# Patient Record
Sex: MALE | Race: WHITE | NOT HISPANIC OR LATINO | Employment: FULL TIME | URBAN - METROPOLITAN AREA
[De-identification: names, ages, dates, MRNs, and addresses within clinical notes are randomized per-mention and may not be internally consistent; named-entity substitution may affect disease eponyms.]

---

## 2017-05-25 ENCOUNTER — TELEPHONE (OUTPATIENT)
Dept: ORTHOPEDIC SURGERY | Facility: CLINIC | Age: 42
End: 2017-05-25

## 2017-05-25 ENCOUNTER — OFFICE VISIT (OUTPATIENT)
Dept: ORTHOPEDIC SURGERY | Facility: CLINIC | Age: 42
End: 2017-05-25

## 2017-05-25 VITALS — HEIGHT: 70 IN | BODY MASS INDEX: 26.92 KG/M2 | TEMPERATURE: 97.2 F | WEIGHT: 188 LBS

## 2017-05-25 DIAGNOSIS — S99.922A FOOT INJURY, LEFT, INITIAL ENCOUNTER: Primary | ICD-10-CM

## 2017-05-25 DIAGNOSIS — S92.355A CLOSED NONDISPLACED FRACTURE OF FIFTH METATARSAL BONE OF LEFT FOOT, INITIAL ENCOUNTER: ICD-10-CM

## 2017-05-25 PROCEDURE — 99214 OFFICE O/P EST MOD 30 MIN: CPT | Performed by: ORTHOPAEDIC SURGERY

## 2017-05-25 PROCEDURE — 73630 X-RAY EXAM OF FOOT: CPT | Performed by: ORTHOPAEDIC SURGERY

## 2017-05-25 RX ORDER — CEFAZOLIN SODIUM 2 G/100ML
2 INJECTION, SOLUTION INTRAVENOUS ONCE
Status: CANCELLED | OUTPATIENT
Start: 2017-05-25 | End: 2017-05-25

## 2017-05-25 RX ORDER — SODIUM CHLORIDE 0.9 % (FLUSH) 0.9 %
1-10 SYRINGE (ML) INJECTION AS NEEDED
Status: CANCELLED | OUTPATIENT
Start: 2017-05-25

## 2017-06-02 ENCOUNTER — APPOINTMENT (OUTPATIENT)
Dept: PREADMISSION TESTING | Facility: HOSPITAL | Age: 42
End: 2017-06-02

## 2017-06-02 VITALS
DIASTOLIC BLOOD PRESSURE: 90 MMHG | HEIGHT: 70 IN | SYSTOLIC BLOOD PRESSURE: 134 MMHG | TEMPERATURE: 97 F | BODY MASS INDEX: 27.2 KG/M2 | HEART RATE: 58 BPM | RESPIRATION RATE: 16 BRPM | WEIGHT: 190 LBS | OXYGEN SATURATION: 99 %

## 2017-06-02 LAB
DEPRECATED RDW RBC AUTO: 39.4 FL (ref 37–54)
ERYTHROCYTE [DISTWIDTH] IN BLOOD BY AUTOMATED COUNT: 13.7 % (ref 11.5–14.5)
HCT VFR BLD AUTO: 44.3 % (ref 40.4–52.2)
HGB BLD-MCNC: 14.9 G/DL (ref 13.7–17.6)
MCH RBC QN AUTO: 26.8 PG (ref 27–32.7)
MCHC RBC AUTO-ENTMCNC: 33.6 G/DL (ref 32.6–36.4)
MCV RBC AUTO: 79.7 FL (ref 79.8–96.2)
PLATELET # BLD AUTO: 292 10*3/MM3 (ref 140–500)
PMV BLD AUTO: 10.2 FL (ref 6–12)
RBC # BLD AUTO: 5.56 10*6/MM3 (ref 4.6–6)
WBC NRBC COR # BLD: 4.13 10*3/MM3 (ref 4.5–10.7)

## 2017-06-02 PROCEDURE — 85027 COMPLETE CBC AUTOMATED: CPT | Performed by: ORTHOPAEDIC SURGERY

## 2017-06-02 PROCEDURE — 36415 COLL VENOUS BLD VENIPUNCTURE: CPT

## 2017-06-02 RX ORDER — ACETAMINOPHEN 500 MG
500 TABLET ORAL EVERY 6 HOURS PRN
COMMUNITY
End: 2017-06-06 | Stop reason: HOSPADM

## 2017-06-02 NOTE — DISCHARGE INSTRUCTIONS
Take the following medications the morning of surgery with a small sip of water:    NONE    ARRIVE AT 5:45    General Instructions:  • Do not eat solid food after midnight the night before surgery.  • You may drink clear liquids day of surgery but must stop at least one hour before your hospital arrival time.  • It is beneficial for you to have a clear drink that contains carbohydrates the day of surgery.  We suggest a 20 ounce bottle of Gatorade or Powerade for non-diabetic patients or a 20 ounce bottle of G2 or Powerade Zero for diabetic patients. (Pediatric patients, are not advised to drink a 20 ounce carbohydrate drink)    Clear liquids are liquids you can see through.  Nothing red in color.     Plain water                               Sports drinks  Sodas                                   Gelatin (Jell-O)  Fruit juices without pulp such as white grape juice and apple juice  Popsicles that contain no fruit or yogurt  Tea or coffee (no cream or milk added)  Gatorade / Powerade  G2 / Powerade Zero    • Infants may have breast milk up to four hours before surgery.  • Infants drinking formula may drink formula up to six hours before surgery.   • Patients who avoid smoking, chewing tobacco and alcohol for 4 weeks prior to surgery have a reduced risk of post-operative complications.  Quit smoking as many days before surgery as you can.  • Do not smoke, use chewing tobacco or drink alcohol the day of surgery.   • If applicable bring your C-PAP/ BI-PAP machine.  • Bring any papers given to you in the doctor’s office.  • Wear clean comfortable clothes and socks.  • Do not wear contact lenses or make-up.  Bring a case for your glasses.   • Bring crutches or walker if applicable.  • Leave all other valuables and jewelry at home.  • The Pre-Admission Testing nurse will instruct you to bring medications if unable to obtain an accurate list in Pre-Admission Testing.        If you were given a blood bank ID arm band remember  to bring it with you the day of surgery.    Preventing a Surgical Site Infection:  • For 2 to 3 days before surgery, avoid shaving with a razor because the razor can irritate skin and make it easier to develop an infection.  • The night prior to surgery sleep in a clean bed with clean clothing.  Do not allow pets to sleep with you.  • Shower on the morning of surgery using a fresh bar of anti-bacterial soap (such as Dial) and clean washcloth.  Dry with a clean towel and dress in clean clothing.  • Ask your surgeon if you will be receiving antibiotics prior to surgery.  • Make sure you, your family, and all healthcare providers clean their hands with soap and water or an alcohol based hand  before caring for you or your wound.    Day of surgery:  Upon arrival, a Pre-op nurse and Anesthesiologist will review your health history, obtain vital signs, and answer questions you may have.  The only belongings needed at this time will be your home medications and if applicable your C-PAP/BI-PAP machine.  If you are staying overnight your family can leave the rest of your belongings in the car and bring them to your room later.  A Pre-op nurse will start an IV and you may receive medication in preparation for surgery, including something to help you relax.  Your family will be able to see you in the Pre-op area.  While you are in surgery your family should notify the waiting room  if they leave the waiting room area and provide a contact phone number.    Please be aware that surgery does come with discomfort.  We want to make every effort to control your discomfort so please discuss any uncontrolled symptoms with your nurse.   Your doctor will most likely have prescribed pain medications.      If you are going home after surgery you will receive individualized written care instructions before being discharged.  A responsible adult must drive you to and from the hospital on the day of your surgery and stay  with you for 24 hours.    If you are staying overnight following surgery, you will be transported to your hospital room following the recovery period.  [facility] has all private rooms.    If you have any questions please call Pre-Admission Testing at [phone].  Deductibles and co-payments are collected on the day of service. Please be prepared to pay the required co-pay, deductible or deposit on the day of service as defined by your plan.

## 2017-06-05 NOTE — H&P
Patient: Romaine Esquivel II  YOB: 1975 41 y.o. male  Medical Record Number: [id number]     Chief Complaints: I hurt my foot     History of Present Illness: Patient underwent extensive left lateral ankle reconstruction and calcaneal osteotomy about 4 years ago and has done very well with that. He's not had any feelings of instability to his foot or ankle or feelings that he is walking on the side of his foot. However 2 weeks ago after some increased activity he had mild intermittent achy pain in the lateral side of his foot and then several days ago stepped awkwardly on a hose twisting his foot with onset of severe constant stabbing pain with swelling in the lateral aspect of the foot worse with standing and walking and improved with ice and rest. Does not have any complaints of pain about the ankle.      HPI     Allergies:        Allergies   Allergen Reactions   • No Known Drug Allergy           Medications:        Current Outpatient Prescriptions on File Prior to Visit   Medication Sig   • glucosamine-chondroitin 500-400 MG capsule capsule Take by mouth 3 (three) times a day with meals.      No current facility-administered medications on file prior to visit.           Medical History    History reviewed. No pertinent past medical history.      Surgical History          Past Surgical History:   Procedure Laterality Date   • ANKLE SURGERY Left           Social History          Occupational History   • Not on file.             Social History Main Topics   • Smoking status: Never Smoker   • Smokeless tobacco: Never Used   • Alcohol use No   • Drug use: No   • Sexual activity: Defer         Comment:       Social History      Social History Narrative      Family History   Problem Relation Age of Onset   • Hypertension Other     • Diabetes Other           Review of Systems: 14 point review of systems performed, positive pertinent findings identified in HPI. All remaining systems negative  "     Review of Systems        Physical Exam:    Vitals        Vitals:     05/25/17 1602   Temp: 97.2 °F (36.2 °C)   TempSrc: Temporal Artery    Weight: 188 lb (85.3 kg)   Height: 70\" (177.8 cm)         Physical Exam   Constitutional: pleasant, well developed   Eyes: sclera non icteric  Hearing : adequate for exam  Cardiovascular: palpable pulses in foot, calf/ thigh NT without sign of DVT  Respiratoy: breathing unlabored   Neurological: grossly sensate to LT throughout LE  Psychiatric: oriented with normal mood and affect.   Lymphatic: No palpable popliteal lymphadenopathy  Skin: intact throughout leg/foot  Musculoskeletal:Examination of left foot shows well-healed incisions about the ankle. He had no pain over the fibula and had 5 out of 5 eversion strength with neutral alignment to the calcaneus. Was moderate discomfort palpation on the lateral aspect of the fifth metatarsal but no pain over the dorsum of the midfoot. He is able to flex and extend his toes well. Does still have a relatively high arch.  No callus along the lateral aspect of the foot  Physical Exam  Ortho Exam     Radiology: 3 views of left foot were ordered to evaluate pain reviewed and compared with x-rays taken previously in our office of his ankle. The calcaneal osteotomy appears to be well-healed there is no lucency around the screws. There does appear to be a fracture at the metaphyseal diaphyseal junction of the fifth metatarsal was felt a slight amount of callus around it but was without significant displacement.     Assessment/Plan: Left fifth metatarsal acute traumatic fracture with probable previous stress reaction for the last 2 weeks.     Status post left lateral ankle reconstruction without feelings of instability or walking on the side of his foot.     I discussed treatment options with the patient both operative and nonoperative with neutral decision made to proceed with operative treatment. I feel that intramedullary fixation and " bone grafting is can give us the best chance of healing as quickly as possible to allow him to return to more normal activities sooner rather than later. I do not feel at this time that we need to do any type of plantar fascial release or first metatarsal osteotomy and he is in agreement with that. He understands we could treat this nonoperatively but it may take 6-8 weeks or longer of nonweightbearing that still may not heal and may then necessitate surgical treatment. He would like to go and proceed with operative treatment in the form of intramedullary fixation and use of probable cadaver graft in the form of BMP. He voiced clear understanding of the operative procedure with associated risks benefits potential outcomes and complications which can include but are not limited to heart attack stroke death pneumonia infection bleeding damage to blood vessels and nerves or tendons blood clots pulmonary embolism persistent or worsening pain stiffness fracture around the screw or need for subsequent surgery to the foot as well as small risk of disease transmission from the bone graft. All of his questions answered to his full satisfaction. We'll schedule this as soon as possible hopefully next week. In the interim he will continue use of his boot and crutches doing touchdown weightbearing only. He will call if he has new questions prior to surgery

## 2017-06-06 ENCOUNTER — ANESTHESIA EVENT (OUTPATIENT)
Dept: PERIOP | Facility: HOSPITAL | Age: 42
End: 2017-06-06

## 2017-06-06 ENCOUNTER — APPOINTMENT (OUTPATIENT)
Dept: GENERAL RADIOLOGY | Facility: HOSPITAL | Age: 42
End: 2017-06-06

## 2017-06-06 ENCOUNTER — HOSPITAL ENCOUNTER (OUTPATIENT)
Facility: HOSPITAL | Age: 42
Setting detail: HOSPITAL OUTPATIENT SURGERY
Discharge: HOME | End: 2017-06-06
Attending: ORTHOPAEDIC SURGERY | Admitting: ORTHOPAEDIC SURGERY

## 2017-06-06 ENCOUNTER — ANESTHESIA (OUTPATIENT)
Dept: PERIOP | Facility: HOSPITAL | Age: 42
End: 2017-06-06

## 2017-06-06 VITALS
TEMPERATURE: 97.6 F | RESPIRATION RATE: 16 BRPM | SYSTOLIC BLOOD PRESSURE: 129 MMHG | WEIGHT: 193 LBS | BODY MASS INDEX: 27.69 KG/M2 | OXYGEN SATURATION: 97 % | DIASTOLIC BLOOD PRESSURE: 76 MMHG | HEART RATE: 56 BPM

## 2017-06-06 DIAGNOSIS — S92.355A CLOSED NONDISPLACED FRACTURE OF FIFTH METATARSAL BONE OF LEFT FOOT, INITIAL ENCOUNTER: ICD-10-CM

## 2017-06-06 DIAGNOSIS — S99.922A FOOT INJURY, LEFT, INITIAL ENCOUNTER: ICD-10-CM

## 2017-06-06 PROCEDURE — 73630 X-RAY EXAM OF FOOT: CPT

## 2017-06-06 PROCEDURE — C1713 ANCHOR/SCREW BN/BN,TIS/BN: HCPCS | Performed by: ORTHOPAEDIC SURGERY

## 2017-06-06 PROCEDURE — 28485 OPTX METATARSAL FX EACH: CPT | Performed by: ORTHOPAEDIC SURGERY

## 2017-06-06 PROCEDURE — 25010000002 ONDANSETRON PER 1 MG: Performed by: NURSE ANESTHETIST, CERTIFIED REGISTERED

## 2017-06-06 PROCEDURE — 25010000002 MIDAZOLAM PER 1 MG: Performed by: ANESTHESIOLOGY

## 2017-06-06 PROCEDURE — 25010000002 PROPOFOL 10 MG/ML EMULSION: Performed by: NURSE ANESTHETIST, CERTIFIED REGISTERED

## 2017-06-06 PROCEDURE — 25010000002 FENTANYL CITRATE (PF) 100 MCG/2ML SOLUTION: Performed by: ANESTHESIOLOGY

## 2017-06-06 PROCEDURE — 25010000002 DEXAMETHASONE PER 1 MG: Performed by: NURSE ANESTHETIST, CERTIFIED REGISTERED

## 2017-06-06 PROCEDURE — 25010000002 DEXAMETHASONE PER 1 MG: Performed by: ANESTHESIOLOGY

## 2017-06-06 PROCEDURE — 25010000003 CEFAZOLIN IN DEXTROSE 2-4 GM/100ML-% SOLUTION: Performed by: ORTHOPAEDIC SURGERY

## 2017-06-06 PROCEDURE — 25010000002 ROPIVACAINE PER 1 MG: Performed by: ANESTHESIOLOGY

## 2017-06-06 PROCEDURE — 76000 FLUOROSCOPY <1 HR PHYS/QHP: CPT

## 2017-06-06 PROCEDURE — 25010000002 FENTANYL CITRATE (PF) 100 MCG/2ML SOLUTION: Performed by: NURSE ANESTHETIST, CERTIFIED REGISTERED

## 2017-06-06 DEVICE — IMPLANTABLE DEVICE: Type: IMPLANTABLE DEVICE | Site: FOOT | Status: FUNCTIONAL

## 2017-06-06 RX ORDER — ONDANSETRON 2 MG/ML
INJECTION INTRAMUSCULAR; INTRAVENOUS AS NEEDED
Status: DISCONTINUED | OUTPATIENT
Start: 2017-06-06 | End: 2017-06-06 | Stop reason: SURG

## 2017-06-06 RX ORDER — MIDAZOLAM HYDROCHLORIDE 1 MG/ML
2 INJECTION INTRAMUSCULAR; INTRAVENOUS
Status: DISCONTINUED | OUTPATIENT
Start: 2017-06-06 | End: 2017-06-06 | Stop reason: HOSPADM

## 2017-06-06 RX ORDER — ACETAMINOPHEN 650 MG
TABLET, EXTENDED RELEASE ORAL AS NEEDED
Status: DISCONTINUED | OUTPATIENT
Start: 2017-06-06 | End: 2017-06-06 | Stop reason: HOSPADM

## 2017-06-06 RX ORDER — DEXAMETHASONE SODIUM PHOSPHATE 10 MG/ML
INJECTION INTRAMUSCULAR; INTRAVENOUS AS NEEDED
Status: DISCONTINUED | OUTPATIENT
Start: 2017-06-06 | End: 2017-06-06 | Stop reason: SURG

## 2017-06-06 RX ORDER — FENTANYL CITRATE 50 UG/ML
50 INJECTION, SOLUTION INTRAMUSCULAR; INTRAVENOUS
Status: DISCONTINUED | OUTPATIENT
Start: 2017-06-06 | End: 2017-06-06 | Stop reason: HOSPADM

## 2017-06-06 RX ORDER — FAMOTIDINE 10 MG/ML
20 INJECTION, SOLUTION INTRAVENOUS ONCE
Status: COMPLETED | OUTPATIENT
Start: 2017-06-06 | End: 2017-06-06

## 2017-06-06 RX ORDER — SODIUM CHLORIDE, SODIUM LACTATE, POTASSIUM CHLORIDE, CALCIUM CHLORIDE 600; 310; 30; 20 MG/100ML; MG/100ML; MG/100ML; MG/100ML
9 INJECTION, SOLUTION INTRAVENOUS CONTINUOUS
Status: DISCONTINUED | OUTPATIENT
Start: 2017-06-06 | End: 2017-06-06 | Stop reason: HOSPADM

## 2017-06-06 RX ORDER — LIDOCAINE HYDROCHLORIDE 20 MG/ML
INJECTION, SOLUTION INFILTRATION; PERINEURAL AS NEEDED
Status: DISCONTINUED | OUTPATIENT
Start: 2017-06-06 | End: 2017-06-06 | Stop reason: SURG

## 2017-06-06 RX ORDER — PROPOFOL 10 MG/ML
VIAL (ML) INTRAVENOUS AS NEEDED
Status: DISCONTINUED | OUTPATIENT
Start: 2017-06-06 | End: 2017-06-06 | Stop reason: SURG

## 2017-06-06 RX ORDER — LABETALOL HYDROCHLORIDE 5 MG/ML
5 INJECTION, SOLUTION INTRAVENOUS
Status: DISCONTINUED | OUTPATIENT
Start: 2017-06-06 | End: 2017-06-06 | Stop reason: HOSPADM

## 2017-06-06 RX ORDER — HYDROCODONE BITARTRATE AND ACETAMINOPHEN 7.5; 325 MG/1; MG/1
1 TABLET ORAL ONCE AS NEEDED
Status: DISCONTINUED | OUTPATIENT
Start: 2017-06-06 | End: 2017-06-06 | Stop reason: HOSPADM

## 2017-06-06 RX ORDER — NALOXONE HCL 0.4 MG/ML
0.2 VIAL (ML) INJECTION AS NEEDED
Status: DISCONTINUED | OUTPATIENT
Start: 2017-06-06 | End: 2017-06-06 | Stop reason: HOSPADM

## 2017-06-06 RX ORDER — PROMETHAZINE HYDROCHLORIDE 25 MG/1
25 TABLET ORAL ONCE AS NEEDED
Status: DISCONTINUED | OUTPATIENT
Start: 2017-06-06 | End: 2017-06-06 | Stop reason: HOSPADM

## 2017-06-06 RX ORDER — DEXAMETHASONE SODIUM PHOSPHATE 4 MG/ML
INJECTION, SOLUTION INTRA-ARTICULAR; INTRALESIONAL; INTRAMUSCULAR; INTRAVENOUS; SOFT TISSUE AS NEEDED
Status: DISCONTINUED | OUTPATIENT
Start: 2017-06-06 | End: 2017-06-06 | Stop reason: SURG

## 2017-06-06 RX ORDER — DIPHENHYDRAMINE HYDROCHLORIDE 50 MG/ML
12.5 INJECTION INTRAMUSCULAR; INTRAVENOUS
Status: DISCONTINUED | OUTPATIENT
Start: 2017-06-06 | End: 2017-06-06 | Stop reason: HOSPADM

## 2017-06-06 RX ORDER — FLUMAZENIL 0.1 MG/ML
0.2 INJECTION INTRAVENOUS AS NEEDED
Status: DISCONTINUED | OUTPATIENT
Start: 2017-06-06 | End: 2017-06-06 | Stop reason: HOSPADM

## 2017-06-06 RX ORDER — MIDAZOLAM HYDROCHLORIDE 1 MG/ML
1 INJECTION INTRAMUSCULAR; INTRAVENOUS
Status: DISCONTINUED | OUTPATIENT
Start: 2017-06-06 | End: 2017-06-06 | Stop reason: HOSPADM

## 2017-06-06 RX ORDER — PROMETHAZINE HYDROCHLORIDE 25 MG/1
12.5 TABLET ORAL ONCE AS NEEDED
Status: DISCONTINUED | OUTPATIENT
Start: 2017-06-06 | End: 2017-06-06 | Stop reason: HOSPADM

## 2017-06-06 RX ORDER — CEFAZOLIN SODIUM 2 G/100ML
2 INJECTION, SOLUTION INTRAVENOUS ONCE
Status: COMPLETED | OUTPATIENT
Start: 2017-06-06 | End: 2017-06-06

## 2017-06-06 RX ORDER — ROPIVACAINE HYDROCHLORIDE 5 MG/ML
INJECTION, SOLUTION EPIDURAL; INFILTRATION; PERINEURAL AS NEEDED
Status: DISCONTINUED | OUTPATIENT
Start: 2017-06-06 | End: 2017-06-06 | Stop reason: SURG

## 2017-06-06 RX ORDER — MAGNESIUM HYDROXIDE 1200 MG/15ML
LIQUID ORAL AS NEEDED
Status: DISCONTINUED | OUTPATIENT
Start: 2017-06-06 | End: 2017-06-06 | Stop reason: HOSPADM

## 2017-06-06 RX ORDER — CEPHALEXIN 500 MG/1
500 CAPSULE ORAL EVERY 6 HOURS
Qty: 8 CAPSULE | Refills: 0 | Status: SHIPPED | OUTPATIENT
Start: 2017-06-06 | End: 2017-06-08

## 2017-06-06 RX ORDER — ONDANSETRON 2 MG/ML
4 INJECTION INTRAMUSCULAR; INTRAVENOUS ONCE AS NEEDED
Status: DISCONTINUED | OUTPATIENT
Start: 2017-06-06 | End: 2017-06-06 | Stop reason: HOSPADM

## 2017-06-06 RX ORDER — PROMETHAZINE HYDROCHLORIDE 25 MG/1
25 SUPPOSITORY RECTAL ONCE AS NEEDED
Status: DISCONTINUED | OUTPATIENT
Start: 2017-06-06 | End: 2017-06-06 | Stop reason: HOSPADM

## 2017-06-06 RX ORDER — PROMETHAZINE HYDROCHLORIDE 25 MG/ML
12.5 INJECTION, SOLUTION INTRAMUSCULAR; INTRAVENOUS ONCE AS NEEDED
Status: DISCONTINUED | OUTPATIENT
Start: 2017-06-06 | End: 2017-06-06 | Stop reason: HOSPADM

## 2017-06-06 RX ORDER — SODIUM CHLORIDE 0.9 % (FLUSH) 0.9 %
1-10 SYRINGE (ML) INJECTION AS NEEDED
Status: DISCONTINUED | OUTPATIENT
Start: 2017-06-06 | End: 2017-06-06 | Stop reason: HOSPADM

## 2017-06-06 RX ORDER — HYDROCODONE BITARTRATE AND ACETAMINOPHEN 7.5; 325 MG/1; MG/1
1-2 TABLET ORAL EVERY 4 HOURS PRN
Qty: 60 TABLET | Refills: 0 | Status: SHIPPED | OUTPATIENT
Start: 2017-06-06 | End: 2017-09-06

## 2017-06-06 RX ORDER — HYDRALAZINE HYDROCHLORIDE 20 MG/ML
5 INJECTION INTRAMUSCULAR; INTRAVENOUS
Status: DISCONTINUED | OUTPATIENT
Start: 2017-06-06 | End: 2017-06-06 | Stop reason: HOSPADM

## 2017-06-06 RX ADMIN — DEXAMETHASONE SODIUM PHOSPHATE 4 MG: 4 INJECTION, SOLUTION INTRAMUSCULAR; INTRAVENOUS at 06:50

## 2017-06-06 RX ADMIN — FENTANYL CITRATE 50 MCG: 50 INJECTION INTRAMUSCULAR; INTRAVENOUS at 06:47

## 2017-06-06 RX ADMIN — SODIUM CHLORIDE, POTASSIUM CHLORIDE, SODIUM LACTATE AND CALCIUM CHLORIDE 9 ML/HR: 600; 310; 30; 20 INJECTION, SOLUTION INTRAVENOUS at 06:13

## 2017-06-06 RX ADMIN — FENTANYL CITRATE 50 MCG: 50 INJECTION, SOLUTION INTRAMUSCULAR; INTRAVENOUS at 09:29

## 2017-06-06 RX ADMIN — ROPIVACAINE HYDROCHLORIDE 25 ML: 5 INJECTION, SOLUTION EPIDURAL; INFILTRATION; PERINEURAL at 06:50

## 2017-06-06 RX ADMIN — SODIUM CHLORIDE, POTASSIUM CHLORIDE, SODIUM LACTATE AND CALCIUM CHLORIDE: 600; 310; 30; 20 INJECTION, SOLUTION INTRAVENOUS at 08:30

## 2017-06-06 RX ADMIN — DEXAMETHASONE SODIUM PHOSPHATE 6 MG: 10 INJECTION INTRAMUSCULAR; INTRAVENOUS at 07:35

## 2017-06-06 RX ADMIN — ONDANSETRON 4 MG: 2 INJECTION INTRAMUSCULAR; INTRAVENOUS at 08:35

## 2017-06-06 RX ADMIN — MIDAZOLAM 2 MG: 1 INJECTION INTRAMUSCULAR; INTRAVENOUS at 06:48

## 2017-06-06 RX ADMIN — CEFAZOLIN SODIUM 2 G: 2 INJECTION, SOLUTION INTRAVENOUS at 07:19

## 2017-06-06 RX ADMIN — LIDOCAINE HYDROCHLORIDE 60 MG: 20 INJECTION, SOLUTION INFILTRATION; PERINEURAL at 07:23

## 2017-06-06 RX ADMIN — PROPOFOL 200 MG: 10 INJECTION, EMULSION INTRAVENOUS at 07:23

## 2017-06-06 RX ADMIN — FAMOTIDINE 20 MG: 10 INJECTION, SOLUTION INTRAVENOUS at 06:37

## 2017-06-06 RX ADMIN — HYDROCODONE BITARTRATE AND ACETAMINOPHEN 1 TABLET: 7.5; 325 TABLET ORAL at 09:13

## 2017-06-06 NOTE — OP NOTE
DATE OF PROCEDURE:  06/06/2017    PREOPERATIVE DIAGNOSIS:  Left 5th metatarsal fracture.     POSTOPERATIVE DIAGNOSIS: Left 5th metatarsal fracture.     PROCEDURE PERFORMED: Intramedullary fixation left 5th metatarsal fracture using Arthrex 4.5 x 45 mm solid screw augmented with demineralized bone matrix (StimuBlast) mixed with platelet rich plasma.     SURGEON: Jas Palacio MD     ASSISTANT: None.     ANESTHESIA: General LMA.     TOURNIQUET TIME: None used.     ESTIMATED BLOOD LOSS: 15 mL.     DISPOSITION: Taken to recovery room in stable condition.     INDICATIONS FOR PROCEDURE: The patient is several weeks status post injury to his left foot. He had some prodromal pain prior to that and presents now for operative fixation. I had a long discussion with he and his family who elected to proceed with operative fixation of the fracture, but no further reconstructive procedures to the foot at this time.     DESCRIPTION OF PROCEDURE: Preoperative informed consent and anesthesia evaluation were obtained. IV antibiotics were administered. Surgical site was marked. Blocks administered by anesthesia. He was brought to the operating room and placed in a supine position. Anesthesia was induced. LMA was positioned. Well-padded tourniquet was placed on the left proximal thigh, but never inflated. The left foot and leg were prepped and draped in a sterile fashion. A surgical timeout was performed. Base of the 5th metatarsal was identified radiographically and clinically. I then used a guidewire for the Arthrex set and delineated on the skin the trajectory of the 5th metatarsal on the AP, oblique, and lateral views. I demarcated those on the skin. An approximately 2 cm incision was made over the base of the 5th metatarsal and spread bluntly down through subcutaneous tissue.  Care taken to protect the peroneal tendon. The base of the 5th metatarsal was identified clinically and radiographically and the guidewire was  positioned and carefully advanced under x-ray imaging past the fracture site and into the intramedullary canal distally. It was contained on all 3 views. This was measured and a 45 mm screw was selected. I then reamed over the wire vigorously reaming through the fracture site. This was then tapped with a 4.5 tap with excellent torque distal to the fracture site with no evidence of iatrogenic fracture.  Guidewire was then removed. I then prepared with 5 mL of PRP mixed with 5 mL of demineralized bone matrix. This was then loaded with a Jamshidi needle which was placed into the intramedullary canal and confirmed radiographically. I then filled the entire intramedullary canal with this mixture.  Care taken to make sure it was nicely filled through the fracture site. The screw was then placed with excellent torque and fixation distal to the fracture site with some compression. There was no evidence of penetration or fracture distally. This was very nicely seated. X-ray showed containment on all 3 views. The wound was irrigated copiously with dilute Betadine solution as had been done throughout the case. The wound was closed with 3-0 Vicryl and staples. Sterile forefoot and ankle dressing was applied. He was awakened, transferred to stretcher, and taken recovery room in stable condition.       Jas Palacio M.D.  VERN:annie  D:   06/06/2017 08:59:16  T:   06/06/2017 09:36:55  Job ID:   [id number]  Document ID:   [id number]  cc:

## 2017-06-06 NOTE — PLAN OF CARE
Problem: Patient Care Overview (Adult)  Goal: Plan of Care Review  Outcome: Ongoing (interventions implemented as appropriate)  Goal: Discharge Needs Assessment  Outcome: Ongoing (interventions implemented as appropriate)    Problem: Perioperative Period (Pediatric)  Goal: Signs and Symptoms of Listed Potential Problems Will be Absent or Manageable (Perioperative Period)  Outcome: Ongoing (interventions implemented as appropriate)

## 2017-06-06 NOTE — ANESTHESIA PREPROCEDURE EVALUATION
Anesthesia Evaluation     Patient summary reviewed and Nursing notes reviewed   NPO Solid Status: > 8 hours  NPO Liquid Status: > 8 hours     Airway   Mallampati: II  no difficulty expected  Dental - normal exam     Pulmonary - negative pulmonary ROS and normal exam   Cardiovascular - negative cardio ROS and normal exam        Neuro/Psych  GI/Hepatic/Renal/Endo      Musculoskeletal     Abdominal    Substance History      OB/GYN          Other                                        Anesthesia Plan    ASA 1     regional and general   (Popliteal block Risks and benefits discussed including bleeding, infection,nerve damage, LA toxicity)  intravenous induction   Anesthetic plan and risks discussed with patient.

## 2017-06-06 NOTE — PLAN OF CARE
Problem: Patient Care Overview (Adult)  Goal: Plan of Care Review  Outcome: Outcome(s) achieved Date Met:  06/06/17  Goal: Discharge Needs Assessment  Outcome: Outcome(s) achieved Date Met:  06/06/17    Problem: Perioperative Period (Pediatric)  Goal: Signs and Symptoms of Listed Potential Problems Will be Absent or Manageable (Perioperative Period)  Outcome: Outcome(s) achieved Date Met:  06/06/17

## 2017-06-06 NOTE — ANESTHESIA PROCEDURE NOTES
Airway  Urgency: elective    Date/Time: 6/6/2017 7:26 AM  Airway not difficult    General Information and Staff    Patient location during procedure: OR  Anesthesiologist: FAUSTINO DANIELS  CRNA: MARK TINAJERO    Indications and Patient Condition  Indications for airway management: airway protection    Preoxygenated: yes  Mask difficulty assessment: 1 - vent by mask    Final Airway Details  Final airway type: supraglottic airway      Successful airway: classic  Size 5    Number of attempts at approach: 1    Additional Comments  Pre 02, sivi, easy bvm, lma placed easily, appears atraumatic, teeth unchanged

## 2017-06-06 NOTE — ANESTHESIA POSTPROCEDURE EVALUATION
Patient: Romaine BERUMEN Esquivel II    Procedure Summary     Date Anesthesia Start Anesthesia Stop Room / Location    06/06/17 0719 0846  [facility]  /  [facility]       Procedure Diagnosis Surgeon Provider    left FOOT fifth METATARSAL BONE screw fixation with  cadaver graft  and prp (Left Foot) Foot injury, left, initial encounter; Closed nondisplaced fracture of fifth metatarsal bone of left foot, initial encounter  (Foot injury, left, initial encounter [S99.922A]; Closed nondisplaced fracture of fifth metatarsal bone of left foot, initial encounter [S92.355A]) MD Tasneem Casanova MD          Anesthesia Type: regional, general  Last vitals  /76 (06/06/17 1035)    Temp      Pulse 56 (06/06/17 1035)   Resp 16 (06/06/17 1035)    SpO2 97 % (06/06/17 1003)      Post Anesthesia Care and Evaluation    Patient location during evaluation: bedside  Patient participation: complete - patient participated  Level of consciousness: awake and alert  Pain management: adequate  Airway patency: patent  Anesthetic complications: No anesthetic complications    Cardiovascular status: acceptable  Respiratory status: acceptable  Hydration status: acceptable

## 2017-06-06 NOTE — BRIEF OP NOTE
FOOT OPEN REDUCTION INTERNAL FIXATION  Procedure Note    Romaine Esquivel RADHAMES  6/6/2017    Pre-op Diagnosis:   Foot injury, left, initial encounter [S99.922A]  Closed nondisplaced fracture of fifth metatarsal bone of left foot, initial encounter [S92.355A]    Post-op Diagnosis:     Post-Op Diagnosis Codes:     * Foot injury, left, initial encounter [S99.922A]     * Closed nondisplaced fracture of fifth metatarsal bone of left foot, initial encounter [S92.355A]    Procedure/CPT® Codes:      Procedure(s):  left FOOT fifth METATARSAL BONE screw fixation with  cadaver graft  and prp    Surgeon(s):  Jas Palacio MD    Anesthesia: General    Staff:   Cell Saver : Moe Shaffer  Circulator: Anel Weldon RN  Radiology Technologist: Farhana Matamoros  Scrub Person: Analisa Neville  Vendor Representative: Mio Garcia  Other: Rosy Shipman RN    Estimated Blood Loss: 15 mL  Urine Voided: * No values recorded between 6/6/2017  7:19 AM and 6/6/2017  8:36 AM *    Specimens:                none      Drains: none              Complications:none      Jas Palacio MD     Date: 6/6/2017  Time: 8:36 AM

## 2017-06-06 NOTE — ANESTHESIA PROCEDURE NOTES
Peripheral Block    Patient location during procedure: holding area  Start time: 6/6/2017 6:49 AM  Stop time: 6/6/2017 6:53 AM  Reason for block: at surgeon's request and post-op pain management  Performed by  Anesthesiologist: FAUSTINO DANIELS  Preanesthetic Checklist  Completed: patient identified, site marked, surgical consent, pre-op evaluation, timeout performed, IV checked, risks and benefits discussed and monitors and equipment checked  Peripheral Block Prep:  Sterile barriers:gloves  Prep: ChloraPrep  Patient monitoring: blood pressure monitoring, continuous pulse oximetry and EKG  Peripheral Procedure  Sedation:yes  Guidance:ultrasound guided  Images:still images obtained    Laterality:left  Block Type:popliteal  Injection Technique:single-shot  Needle Type:echogenic  Needle Gauge:22 G  ULTRASOUND INTERPRETATION.  Using ultrasound guidance a 22 G gauge needle was placed in close proximity to the sciatic nerve, at which point, under ultrasound guidance anesthetic was injected in the area of the nerve and spread of the anesthesia was seen on ultrasound in close proximity thereto.  There were no abnormalities seen on ultrasound; a digital image was taken; and the patient tolerated the procedure with no complications.   Medications  Comment:Decadron 4mg  Local Injected:ropivacaine 0.5% Local Amount Injected:25mL  Post Assessment  Injection Assessment: negative aspiration for heme, no paresthesia on injection and incremental injection  Complications:no

## 2017-06-16 ENCOUNTER — OFFICE VISIT (OUTPATIENT)
Dept: ORTHOPEDIC SURGERY | Facility: CLINIC | Age: 42
End: 2017-06-16

## 2017-06-16 VITALS — WEIGHT: 191 LBS | BODY MASS INDEX: 27.35 KG/M2 | HEIGHT: 70 IN | TEMPERATURE: 97.6 F

## 2017-06-16 DIAGNOSIS — S92.355D CLOSED NONDISPLACED FRACTURE OF FIFTH METATARSAL BONE OF LEFT FOOT WITH ROUTINE HEALING, SUBSEQUENT ENCOUNTER: Primary | ICD-10-CM

## 2017-06-16 PROCEDURE — 73630 X-RAY EXAM OF FOOT: CPT | Performed by: ORTHOPAEDIC SURGERY

## 2017-06-16 PROCEDURE — 99024 POSTOP FOLLOW-UP VISIT: CPT | Performed by: ORTHOPAEDIC SURGERY

## 2017-06-16 NOTE — PROGRESS NOTES
"Foot Follow Up      Patient: Romaine Esquivel II    YOB: 1975 41 y.o. male    Chief Complaints: Foot doing well    History of Present Illness: Left fifth metatarsal fracture fixation from 6/6/17.  He's been nonweightbearing has really no complaints regarding pain other than some very mild achiness.  Has not been taking any pain medication.  HPI    ROS: Foot pain  Past Medical History:   Diagnosis Date   • Arthritis     LT ELBOW   • Toe pain      Physical Exam:   Vitals:    06/16/17 1538   Temp: 97.6 °F (36.4 °C)   TempSrc: Temporal Artery    Weight: 191 lb (86.6 kg)   Height: 70\" (177.8 cm)     Well developed with normal mood.Left foot incision is healing without drainage warmth or erythema.  Grossly sensate light touch dorsolateral left foot.  Left calf nontender without sign of DVT      Radiology: 3 views of left foot ordered to evaluate postoperative alignment reviewed and compared to preoperative x-rays.  Fifth metatarsal fracture is in good alignment and hardware is well contained.      Assessment/Plan:  Status post left fifth metatarsal fracture intramedullary fixation.  Overall he is doing quite well Staples removed and Steri-Strips are applied continue nonweightbearing he may let this get wet in the shower and we'll redress it and gently applied Ace from the toes to the midcalf.  I will see him back in 2 weeks' x-rays of his left foot he will bring his boot with him  "

## 2017-07-03 ENCOUNTER — OFFICE VISIT (OUTPATIENT)
Dept: ORTHOPEDIC SURGERY | Facility: CLINIC | Age: 42
End: 2017-07-03

## 2017-07-03 VITALS — WEIGHT: 185 LBS | HEIGHT: 71 IN | TEMPERATURE: 98 F | BODY MASS INDEX: 25.9 KG/M2

## 2017-07-03 DIAGNOSIS — S92.355D CLOSED NONDISPLACED FRACTURE OF FIFTH METATARSAL BONE OF LEFT FOOT WITH ROUTINE HEALING, SUBSEQUENT ENCOUNTER: Primary | ICD-10-CM

## 2017-07-03 PROCEDURE — 73630 X-RAY EXAM OF FOOT: CPT | Performed by: ORTHOPAEDIC SURGERY

## 2017-07-03 PROCEDURE — 99024 POSTOP FOLLOW-UP VISIT: CPT | Performed by: ORTHOPAEDIC SURGERY

## 2017-07-03 NOTE — PROGRESS NOTES
"Foot Follow Up      Patient: Romaine Esquivel II    YOB: 1975 41 y.o. male    Chief Complaints: Foot doing well    History of Present Illness: 4 weeks out left fifth metatarsal fixation from 6/6/17.  Last seen on 6/16/17.  He's been nonweightbearing since that time really has no significant complaints regarding pain since feels a little numb and tingly around his incision.  HPI    ROS: Foot pain  Past Medical History:   Diagnosis Date   • Arthritis     LT ELBOW   • Toe pain      Physical Exam:   Vitals:    07/03/17 0830   Temp: 98 °F (36.7 °C)   TempSrc: Temporal Artery    Weight: 185 lb (83.9 kg)   Height: 70.5\" (179.1 cm)     Well developed with normal mood.Left foot incision is well-healed there is some slightly diminished sensation around the incision but grossly sensate light touch distally and laterally.  No focal pain along the fifth metatarsal.  Left calf is nontender without sign of DVT      Radiology: 3 views left foot ordered to evaluate postoperative alignment reviewed and compared with x-rays from last visit.  Hardware remains intact fracture line is still somewhat visible but there has been some increase in callus and periosteal calcification      Assessment/Plan:  Status post left fifth metatarsal intramedullary fixation.  He may do 50% weightbearing with his boot for the next 2 weeks and if he is without pain at that time he may go to one crutch under contralateral arm that have demonstrated for him today.    Follow-up 3 weeks' x-rays left foot.  If he is doing well we will wean off crutches.  He will need a prescription for orthotics.  "

## 2017-07-17 ENCOUNTER — OFFICE VISIT (OUTPATIENT)
Dept: ORTHOPEDIC SURGERY | Facility: CLINIC | Age: 42
End: 2017-07-17

## 2017-07-17 VITALS — WEIGHT: 189 LBS | BODY MASS INDEX: 27.06 KG/M2 | HEIGHT: 70 IN | TEMPERATURE: 98.9 F

## 2017-07-17 DIAGNOSIS — S92.355D CLOSED NONDISPLACED FRACTURE OF FIFTH METATARSAL BONE OF LEFT FOOT WITH ROUTINE HEALING, SUBSEQUENT ENCOUNTER: Primary | ICD-10-CM

## 2017-07-17 PROCEDURE — 99024 POSTOP FOLLOW-UP VISIT: CPT | Performed by: ORTHOPAEDIC SURGERY

## 2017-07-17 PROCEDURE — 73630 X-RAY EXAM OF FOOT: CPT | Performed by: ORTHOPAEDIC SURGERY

## 2017-07-17 NOTE — PROGRESS NOTES
"Foot Follow Up      Patient: Romaine Esquivel II    YOB: 1975 41 y.o. male    Chief Complaints: Foot doing well    History of Present Illness: 6 weeks out now from left fifth metatarsal fixation on 6/6/16.  He's been doing one crutch weightbearing since I last saw him and walked in today without his crutches and said his foot did not hurt.  Still has a little bit of tingling and numbness around the incision  HPI    ROS: Foot pain  Past Medical History:   Diagnosis Date   • Arthritis     LT ELBOW   • Toe pain      Physical Exam:   Vitals:    07/17/17 0827   Temp: 98.9 °F (37.2 °C)   Weight: 189 lb (85.7 kg)   Height: 70\" (177.8 cm)     Well developed with normal mood. Incision remains well-healed.  No focal pain along the fifth metatarsal.  5 out of 5 eversion strength.  Grossly sensate light touch over the lateral aspect of the foot but slightly diminished sensation but no mottling or hyperesthesia      Radiology: 3 views of left foot ordered to evaluate postoperative alignment reviewed and compared with x-rays from last visit.  Hardware remains intact and there is been increased callus formation and periosteal calcification around the fracture site.      Assessment/Plan:  S post left fifth metatarsal fixation.  He may do one crutch weightbearing for the next week and a half and if he is without pain he may go to just the boot.  Still has relatively high arch and is had previous extensive peroneal reconstruction.  We'll send him to [facility] for accommodative orthotics to help unload the lateral hindfoot and midfoot.    Follow-up in 3 weeks' x-rays left foot  "

## 2017-08-07 ENCOUNTER — OFFICE VISIT (OUTPATIENT)
Dept: ORTHOPEDIC SURGERY | Facility: CLINIC | Age: 42
End: 2017-08-07

## 2017-08-07 VITALS — WEIGHT: 188 LBS | HEIGHT: 71 IN | BODY MASS INDEX: 26.32 KG/M2 | TEMPERATURE: 97.8 F

## 2017-08-07 DIAGNOSIS — Z98.890 S/P FOOT SURGERY, LEFT: Primary | ICD-10-CM

## 2017-08-07 PROCEDURE — 73630 X-RAY EXAM OF FOOT: CPT | Performed by: ORTHOPAEDIC SURGERY

## 2017-08-07 PROCEDURE — 99024 POSTOP FOLLOW-UP VISIT: CPT | Performed by: ORTHOPAEDIC SURGERY

## 2017-08-07 NOTE — PROGRESS NOTES
"Foot Follow Up      Patient: Romaine Esquivel II    YOB: 1975 41 y.o. male    Chief Complaints: Foot feels like is getting better    History of Present Illness: Follows up left fifth metatarsal fixation 6/6/16.  He has been weightbearing with just his boot with really no complaints regarding pain left foot other than some  Numbness.    He had extensive peroneal tendon and hindfoot reconstruction in the past.  HPI    ROS: Foot numbness  Past Medical History:   Diagnosis Date   • Arthritis     LT ELBOW   • Toe pain      Physical Exam:   Vitals:    08/07/17 0842   Temp: 97.8 °F (36.6 °C)   TempSrc: Temporal Artery    Weight: 188 lb (85.3 kg)   Height: 70.5\" (179.1 cm)     Well developed with normal mood.Nonantalgic gait.  Left fifth metatarsal incision is well-healed no focal pain to palpation along the fifth metatarsal but some diminished sensation around the incision.  No mottling or hyperesthesia to the left foot  5 out of 5 eversion strength with stable talar tilt and anterior drawer.      Radiology: 3 views left foot ordered to evaluate postoperative alignment reviewed and compared with x-rays from last visit.  Fifth metatarsal fracture appears to be healing in good alignment with good callus formation hardware is intact and well contained 1.  Status post left fifth metatarsal fixation.    Overall he is doing quite well he may wean out of his boot around the house for the next 2 weeks with a stiff well cushioned supportive athletic shoe.  If at that time he is without pain he may transition out of the boot altogether to shoe as described above.  If he has any increase in pain he'll get back in his boot and let me know.    Otherwise will follow-up in 4 weeks' x-rays of his left foot      Assessment/Plan:    "

## 2017-09-06 ENCOUNTER — OFFICE VISIT (OUTPATIENT)
Dept: ORTHOPEDIC SURGERY | Facility: CLINIC | Age: 42
End: 2017-09-06

## 2017-09-06 VITALS — HEIGHT: 71 IN | BODY MASS INDEX: 26.32 KG/M2 | TEMPERATURE: 97.6 F | WEIGHT: 188 LBS

## 2017-09-06 DIAGNOSIS — Z98.890 S/P FOOT SURGERY, LEFT: Primary | ICD-10-CM

## 2017-09-06 DIAGNOSIS — S92.355D CLOSED NONDISPLACED FRACTURE OF FIFTH METATARSAL BONE OF LEFT FOOT WITH ROUTINE HEALING, SUBSEQUENT ENCOUNTER: ICD-10-CM

## 2017-09-06 PROCEDURE — 99212 OFFICE O/P EST SF 10 MIN: CPT | Performed by: ORTHOPAEDIC SURGERY

## 2017-09-06 PROCEDURE — 73630 X-RAY EXAM OF FOOT: CPT | Performed by: ORTHOPAEDIC SURGERY

## 2017-09-06 NOTE — PROGRESS NOTES
"Foot Follow Up      Patient: Romaine Esquivel II    YOB: 1975 41 y.o. male    Chief Complaints: Foot feels fine    History of Present Illness: Patient had left fifth metatarsal fixation from 6/6/16.  He came out of his boot around a week ago is been using athletic shoes with good arch support cushion and stiff sole.  Over the last day or 2 it started wearing some dress shoes to work.  Really no complaints of pain in the left foot.    He's had extensive peroneal tendon and hindfoot reconstruction in the past and continues to do well with that with no feelings of instability or peroneal pain.  HPI    ROS: No Foot pain  Past Medical History:   Diagnosis Date   • Arthritis     LT ELBOW   • Toe pain      Physical Exam:   Vitals:    09/06/17 0849   Temp: 97.6 °F (36.4 °C)   TempSrc: Temporal Artery    Weight: 188 lb (85.3 kg)   Height: 70.5\" (179.1 cm)     Well developed with normal mood.Nonantalgic gait in lace up dress shoes.  Neutral alignment to the hindfoot with no pain along the peroneal tendons with 5 out of 5 eversion strength.  No tenderness to palpation along the fifth metatarsal.  Stable ligamentous exam left ankle      Radiology: 3 views of the left foot were ordered to evaluate fracture reviewed and compared with x-rays from last visit.  There is been increased callus formation on the fifth metatarsal it appears to be essentially healed and hardware is intact.  There are no lucencies around the posterior screws from previous calcaneal osteotomy      Assessment/Plan:  1.  Status post left fifth metatarsal fixation.  Overall he seems be doing quite well he would like to do some physical therapy at [facility] in [place] at his request for some general strengthening which I think is fine.  He'll continue with well cushioned sturdy supportive shoes and no running or jumping.  Given his extensive history of recommended he avoid impact activity long-term a stick with low-impact exercise such as " exercise a regular bike, swimming walking etc.    If anything worsens he'll let me know otherwise I will see him back as needed

## 2017-10-24 ENCOUNTER — TELEPHONE (OUTPATIENT)
Dept: FAMILY MEDICINE CLINIC | Facility: CLINIC | Age: 42
End: 2017-10-24

## 2017-10-24 NOTE — TELEPHONE ENCOUNTER
LEFT VOICEMAIL LETTING PATIENT KNOW THAT I ASKED SAHIL IF SHE WOULD SEE HIM FOR THIS AND SHE SAID NO- THAT HE NEEDS TO GO TO THE ER.    ----- Message from Meenu Felipe sent at 10/24/2017 11:58 AM EDT -----  Contact: PT  PT CALLED AND WANTED AN APPT WITH DR DESAI. PT SAID IT FEELS LIKE SOMETHING IS SITTING ON HIS CHEST. TOLD PT DR HUGHES HAS NO APPTS TILL JAN 2018 AND HE IS ASKING IF SAHIL WOULD SEE HIM. I DID NOT EVEN ASK HER YET BECAUSE I THINK YOU NEED TO SPEAK WITH HIM 1ST, DUE TO WHAT TYPE OF SYMPTOMS HE IS HAVING. WE ALSO NEED TO ASK SAHIL IF THAT IS OK BEFORE WE PUT HIM ON HER DEANDRE.    PT'S # [phone]

## 2018-02-12 DIAGNOSIS — Z00.00 ROUTINE HEALTH MAINTENANCE: Primary | ICD-10-CM

## 2018-02-19 ENCOUNTER — CLINICAL SUPPORT (OUTPATIENT)
Dept: FAMILY MEDICINE CLINIC | Facility: CLINIC | Age: 43
End: 2018-02-19

## 2018-02-19 DIAGNOSIS — Z00.00 ROUTINE HEALTH MAINTENANCE: Primary | ICD-10-CM

## 2018-02-19 LAB
ALBUMIN SERPL-MCNC: 4.6 G/DL (ref 3.5–5.2)
ALBUMIN/GLOB SERPL: 1.8 G/DL
ALP SERPL-CCNC: 57 U/L (ref 39–117)
ALT SERPL-CCNC: 21 U/L (ref 1–41)
APPEARANCE UR: CLEAR
AST SERPL-CCNC: 20 U/L (ref 1–40)
BILIRUB SERPL-MCNC: 0.5 MG/DL (ref 0.1–1.2)
BILIRUB UR QL STRIP: NEGATIVE
BUN SERPL-MCNC: 16 MG/DL (ref 6–20)
BUN/CREAT SERPL: 13.8 (ref 7–25)
CALCIUM SERPL-MCNC: 9.8 MG/DL (ref 8.6–10.5)
CHLORIDE SERPL-SCNC: 100 MMOL/L (ref 98–107)
CHOLEST SERPL-MCNC: 169 MG/DL (ref 0–200)
CO2 SERPL-SCNC: 28.7 MMOL/L (ref 22–29)
COLOR UR: YELLOW
CREAT SERPL-MCNC: 1.16 MG/DL (ref 0.76–1.27)
GFR SERPLBLD CREATININE-BSD FMLA CKD-EPI: 68 ML/MIN/1.73
GFR SERPLBLD CREATININE-BSD FMLA CKD-EPI: 83 ML/MIN/1.73
GLOBULIN SER CALC-MCNC: 2.5 GM/DL
GLUCOSE SERPL-MCNC: 83 MG/DL (ref 65–99)
GLUCOSE UR QL: NEGATIVE
HDLC SERPL-MCNC: 59 MG/DL (ref 40–60)
HGB UR QL STRIP: NEGATIVE
KETONES UR QL STRIP: NEGATIVE
LDLC SERPL CALC-MCNC: 84 MG/DL (ref 0–100)
LDLC/HDLC SERPL: 1.43 {RATIO}
LEUKOCYTE ESTERASE UR QL STRIP: NEGATIVE
NITRITE UR QL STRIP: NEGATIVE
PH UR STRIP: 7 [PH] (ref 5–8)
POTASSIUM SERPL-SCNC: 4.6 MMOL/L (ref 3.5–5.2)
PROT SERPL-MCNC: 7.1 G/DL (ref 6–8.5)
PROT UR QL STRIP: NEGATIVE
SODIUM SERPL-SCNC: 141 MMOL/L (ref 136–145)
SP GR UR: 1.03 (ref 1–1.03)
TRIGL SERPL-MCNC: 128 MG/DL (ref 0–150)
UROBILINOGEN UR STRIP-MCNC: NORMAL MG/DL
VLDLC SERPL CALC-MCNC: 25.6 MG/DL (ref 5–40)

## 2018-02-19 PROCEDURE — 93000 ELECTROCARDIOGRAM COMPLETE: CPT

## 2018-02-19 PROCEDURE — 71046 X-RAY EXAM CHEST 2 VIEWS: CPT

## 2018-02-19 PROCEDURE — 85025 COMPLETE CBC W/AUTO DIFF WBC: CPT

## 2018-02-26 ENCOUNTER — OFFICE VISIT (OUTPATIENT)
Dept: FAMILY MEDICINE CLINIC | Facility: CLINIC | Age: 43
End: 2018-02-26

## 2018-02-26 VITALS
DIASTOLIC BLOOD PRESSURE: 80 MMHG | RESPIRATION RATE: 18 BRPM | SYSTOLIC BLOOD PRESSURE: 132 MMHG | BODY MASS INDEX: 27.44 KG/M2 | TEMPERATURE: 97.9 F | WEIGHT: 196 LBS | HEIGHT: 71 IN | OXYGEN SATURATION: 99 % | HEART RATE: 56 BPM

## 2018-02-26 DIAGNOSIS — Z00.00 HEALTH MAINTENANCE EXAMINATION: Primary | ICD-10-CM

## 2018-02-26 DIAGNOSIS — Z00.00 ANNUAL PHYSICAL EXAM: ICD-10-CM

## 2018-02-26 LAB — HEMOCCULT STL QL IA: NEGATIVE

## 2018-02-26 PROCEDURE — 82274 ASSAY TEST FOR BLOOD FECAL: CPT

## 2018-02-26 PROCEDURE — 99396 PREV VISIT EST AGE 40-64: CPT

## 2018-02-26 NOTE — PROGRESS NOTES
Lindsey Esquivel II is a 42 y.o. male. Patient is here today for   Chief Complaint   Patient presents with   • Annual Exam          Vitals:    02/26/18 1027   BP: 132/80   Pulse: 56   Resp: 18   Temp: 97.9 °F (36.6 °C)   SpO2: 99%       The following portions of the patient's history were reviewed and updated as appropriate: allergies, current medications, past family history, past medical history, past social history, past surgical history and problem list.    Past Medical History:   Diagnosis Date   • Arthritis     LT ELBOW   • Toe pain       Allergies   Allergen Reactions   • Percocet [Oxycodone-Acetaminophen] Itching      Social History     Social History   • Marital status:      Spouse name: N/A   • Number of children: N/A   • Years of education: N/A     Occupational History   • Not on file.     Social History Main Topics   • Smoking status: Never Smoker   • Smokeless tobacco: Never Used   • Alcohol use No   • Drug use: No   • Sexual activity: Defer      Comment:      Other Topics Concern   • Not on file     Social History Narrative      No current outpatient prescriptions on file.     EKG  Normal except for sinus bradycardia with heart rate at 45, similar to previous EKGs    Objective   History of Present Illness   The patient is here today for an annual physical examination    Review of Systems   Constitutional: Negative for appetite change, chills, fatigue and fever.        The patient exercises on a regular basis.  No significant weight change.   HENT: Negative for sore throat and tinnitus.         The patient has some mild upper respiratory allergy symptoms such as postnasal drainage and nasal congestion.  The patient states that he takes Zyrtec every night for this which gives him adequate relief.   Eyes: Negative for photophobia and visual disturbance.   Respiratory: Negative for cough, shortness of breath and wheezing.    Cardiovascular: Negative for chest pain,  palpitations and leg swelling.   Gastrointestinal: Negative for abdominal pain, blood in stool, constipation and diarrhea.   Genitourinary:        The patient occasionally has to get up once during the night to urinate.  There is no urinary hesitancy.  The patient is having some mild to moderate erectile dysfunction   Musculoskeletal: Negative.    Skin: Negative.    Neurological: Negative.    Hematological: Negative.    Psychiatric/Behavioral:        Moderate work stress       Physical Exam   Constitutional: He is oriented to person, place, and time. He appears well-developed and well-nourished.   HENT:   Head: Normocephalic.   Right Ear: External ear normal.   Left Ear: External ear normal.   Mouth/Throat: Oropharynx is clear and moist.   Moderate wax accumulation in both ear canals   Eyes: Pupils are equal, round, and reactive to light.   Neck: No thyromegaly present.   Carotid pulses normal   Cardiovascular: Normal rate, regular rhythm, normal heart sounds and intact distal pulses.  Exam reveals no friction rub.    No murmur heard.  Pulmonary/Chest: Effort normal and breath sounds normal. No respiratory distress. He has no wheezes. He has no rales.   Abdominal: Soft. Bowel sounds are normal. He exhibits no distension and no mass. There is no tenderness. There is no guarding.   Genitourinary: Rectum normal, prostate normal and penis normal. Rectal exam shows guaiac negative stool.   Musculoskeletal: Normal range of motion. He exhibits no edema or deformity.   Lymphadenopathy:     He has no cervical adenopathy.   Neurological: He is alert and oriented to person, place, and time. No cranial nerve deficit. Coordination normal.   Skin: Skin is warm and dry.   Psychiatric: He has a normal mood and affect.   Nursing note and vitals reviewed.      ASSESSMENT  #1 annual physical exam               #2 mild upper respiratory allergies                #3 history of left fifth metatarsal fracture with subsequent  surgery    DISCUSSION/SUMMARY  The patient's vital signs are normal today.  Chest x-ray is normal.  EKG is normal except for sinus bradycardia with rate at 45.  CMP is normal.  CBC and urinalysis were normal.  Total cholesterol is 169, triglycerides 128, HDL cholesterol 59 and LDL cholesterol is 84.  The patient has no major medical illnesses in the past.  The patient is a nonsmoker and only rarely consumes any alcohol.  The patient's diet is good and he exercises on a regular basis.  The patient does have a moderate amount of work stress.  There is no family history for premature coronary artery disease, diabetes or GI problems.    PLAN  Annual physical exam in approximately one year    No Follow-up on file.

## 2022-10-28 NOTE — DISCHARGE INSTRUCTIONS
What to expect after a Nerve Block    Nerve blocks administered to block pain affect many types of nerves, including those nerves that control movement, pain, and normal sensation. Following a nerve block, you may notice some bruising at the site where the block was given. You may experience sensations such as: numbness of the affected area or limb, tingling, heaviness (that is the limb feels heavy to you), weakness or inability to move the affected arm or leg, or a feeling as if your arm or leg has “fallen asleep.”     A nerve block can last from 2 to 36 hours depending on the medications used.  Usually the weakness wears off first followed by the tingling and heaviness. As the block wears off, you may begin to notice pain; however, this sequence of events may occur in any order. Typically, you will be able to move your limb before you will feel it. Once a nerve block begins to wear off, the effects are usually completely gone within 60 minutes.  If you experience continued side effects that you believe are block related for longer than 48 hours, please call your healthcare provider. Please see block-specific instructions below.    Instructions for any Block involving the leg/foot:   If you have had a leg /foot block, you should not bear weight on the affected leg until the block has worn off. After the block has worn off, weight bearing should be as directed by your surgeon. You may be sent home with crutches. You are at high risk for falling because of the anesthetic effects on your leg. Please use caution when standing or trying to move or walk. Have someone assist you until your leg and foot function have returned to normal.     Protection of a “blocked” limb  • After a nerve block, you cannot feel pain, pressure, or extremes of temperature in the affected limb. And because of this, your blocked limb is at more risk for injury. For example, it is possible to burn your limb on an extremely hot surface  Signed Prescriptions:                        Disp   Refills    oxyCODONE (ROXICODONE) 5 MG/5ML solution   1200 mL0        Sig: Take 5-10 mLs (5-10 mg) by mouth 4 times daily as           needed for pain Max of 40mg/day. Put at least 4           hours between doses. Fill 11/03/22 and start           11/05/22. 30 day supply for chronic pain.  Authorizing Provider: NATALIE MCDOWELL    fentaNYL (DURAGESIC) 25 mcg/hr 72 hr patch 15 pat*0        Sig: Place 1 patch onto the skin every 48 hours remove old           patch. Fill 11/03/22 and start 11/05/22. 30 days           for chronic pain.  Authorizing Provider: NATALIE MCDOWELL    Reviewed [place]  October 28, 2022- no concerning fills.    Natalie MENARD, RN CNP, FNP  [facility]  [facility] [place]       without feeling it.     • When resting, it is important to reposition your limb periodically to avoid prolonged pressure on it. This may require the use of pillows and padding.    • While sleeping, you should avoid rolling onto the affected limb or putting too much pressure on it.     • If you have a cast or tight dressing, check the color of your fingers or toes of the affected limb. Call your surgeon if they look discolored (that is, dusky, dark colored).    • Use caution in cold weather. Cover your limb appropriately to protect it from the cold.    Pain Management:  Your surgeon will give you a prescription for pain medication. Begin taking this before the nerve block wears off. Bear in mind that sometimes the block can wear off in the middle of the night.

## 2023-05-08 ENCOUNTER — OFFICE VISIT (OUTPATIENT)
Dept: FAMILY MEDICINE CLINIC | Facility: CLINIC | Age: 48
End: 2023-05-08
Payer: COMMERCIAL

## 2023-05-08 VITALS
HEIGHT: 70 IN | BODY MASS INDEX: 28.77 KG/M2 | DIASTOLIC BLOOD PRESSURE: 100 MMHG | HEART RATE: 50 BPM | OXYGEN SATURATION: 98 % | SYSTOLIC BLOOD PRESSURE: 130 MMHG | WEIGHT: 201 LBS

## 2023-05-08 DIAGNOSIS — D48.5 NEOPLASM OF UNCERTAIN BEHAVIOR OF SKIN OF FOREARM: ICD-10-CM

## 2023-05-08 DIAGNOSIS — R03.0 ELEVATED BLOOD PRESSURE READING: ICD-10-CM

## 2023-05-08 DIAGNOSIS — D48.5 NEOPLASM OF UNCERTAIN BEHAVIOR OF SKIN OF FACE: Primary | ICD-10-CM

## 2023-05-08 PROCEDURE — 99203 OFFICE O/P NEW LOW 30 MIN: CPT | Performed by: PHYSICIAN ASSISTANT

## 2023-05-08 NOTE — PROGRESS NOTES
"Chief Complaint  dermatology referral    Subjective          Romaine Esquivel II presents to [facility]  History of Present Illness  Patient in today to establish care and is requesting to get a referral for dermatology.  He states he has had several skin lesions he would like for them to look at.  One is located on the left side of his face has been there about 6 months.  Denies any bleeding or itching to this area but has not gone away.  He also has 2 small areas on his right forearm that he states are similar and have been there the last 4 to 6 months.  He also has a small skin area to the bottom of his foot that he would like for them to look at.  Overall states he has been feeling well.  He states he had fasting labs done at the end of last year that were normal.  He states he is utd on screening colonoscopy.       Objective   Vital Signs:   /100   Pulse 50   Ht 177.8 cm (70\")   Wt 91.2 kg (201 lb)   SpO2 98%   BMI 28.84 kg/m²     Body mass index is 28.84 kg/m².    Review of Systems   Constitutional: Negative for fever.   HENT: Negative for congestion and sore throat.    Respiratory: Negative for cough and shortness of breath.    Cardiovascular: Negative for chest pain and palpitations.   Gastrointestinal: Negative for abdominal pain, blood in stool, diarrhea, nausea and vomiting.   Skin: Positive for skin lesions.   Neurological: Negative for dizziness and headache.       Past History:  Medical History: has a past medical history of Arthritis and Toe pain.   Surgical History: has a past surgical history that includes Ankle surgery (Left) and ORIF foot fracture (Left, 6/6/2017).   Family History: family history includes Diabetes in an other family member; Hypertension in an other family member.   Social History: reports that he has never smoked. He has never used smokeless tobacco. He reports that he does not drink alcohol and does not use drugs.    No current " outpatient medications on file.  Allergies: Patient has no known allergies.    Physical Exam        Assessment and Plan   Diagnoses and all orders for this visit:    1. Neoplasm of uncertain behavior of skin of face (Primary)  -     Ambulatory Referral to Dermatology  Will put in referral for dermatology for evaluation to skin areas.   2. Neoplasm of uncertain behavior of skin of forearm  -     Ambulatory Referral to Dermatology    3. Elevated blood pressure reading  Encouraged patient to monitor bp- he states is normally not elevated. Encouraged healthy diet and exercise. States his normal pulse rate is in the 50s. RTC prn.           Follow Up   No follow-ups on file.  Patient was given instructions and counseling regarding his condition or for health maintenance advice. Please see specific information pulled into the AVS if appropriate.     Jania Medellin PA-C

## 2023-08-08 ENCOUNTER — OFFICE VISIT (OUTPATIENT)
Dept: FAMILY MEDICINE CLINIC | Facility: CLINIC | Age: 48
End: 2023-08-08
Payer: COMMERCIAL

## 2023-08-08 VITALS
HEIGHT: 70 IN | OXYGEN SATURATION: 98 % | BODY MASS INDEX: 28.2 KG/M2 | SYSTOLIC BLOOD PRESSURE: 130 MMHG | DIASTOLIC BLOOD PRESSURE: 80 MMHG | HEART RATE: 44 BPM | WEIGHT: 197 LBS

## 2023-08-08 DIAGNOSIS — R06.02 SHORTNESS OF BREATH: ICD-10-CM

## 2023-08-08 DIAGNOSIS — Z00.00 ROUTINE GENERAL MEDICAL EXAMINATION AT A HEALTH CARE FACILITY: Primary | ICD-10-CM

## 2023-08-08 DIAGNOSIS — F41.9 ANXIETY: ICD-10-CM

## 2023-08-08 PROCEDURE — 93000 ELECTROCARDIOGRAM COMPLETE: CPT | Performed by: FAMILY MEDICINE

## 2023-08-08 PROCEDURE — 99396 PREV VISIT EST AGE 40-64: CPT | Performed by: FAMILY MEDICINE

## 2023-08-08 RX ORDER — BUPROPION HYDROCHLORIDE 150 MG/1
150 TABLET ORAL DAILY
Qty: 30 TABLET | Refills: 2 | Status: SHIPPED | OUTPATIENT
Start: 2023-08-08

## 2023-08-08 NOTE — PROGRESS NOTES
Male Physical Note      Date:  2023   Patient Name: Romaine Esquivel II  : 1975   MRN: [id number]     Chief Complaint:    Chief Complaint   Patient presents with    Annual Exam     Episodes of shortness of breath with dizziness, elevated blood pressure       History of Present Illness: Romaine Esquivel II is a 47 y.o. male who is here today for their annual health maintenance and physical.  Health maintenance discussed.  Discussed appropriate vaccines and cancer screenings.  Overall patient seems to be quite healthy.  He exercises daily.  He has however been having a little bit of some mild lightheadedness.  Mild shortness of breath and mild chest pressure.  Does acosta some anxiety.  He is unsure if this was the cause or something else was going on.      Subjective      Review of Systems:   Review of Systems   Constitutional:  Negative for fatigue and fever.   HENT:  Negative for congestion and ear pain.    Respiratory:  Negative for apnea, cough, chest tightness and shortness of breath.    Cardiovascular:  Negative for chest pain.   Gastrointestinal:  Negative for abdominal pain, constipation, diarrhea and nausea.   Musculoskeletal:  Negative for arthralgias.   Psychiatric/Behavioral:  Negative for depressed mood and stress.      Past Medical History:   Past Medical History:   Diagnosis Date    Arthritis     LT ELBOW    Toe pain        Past Surgical History:   Past Surgical History:   Procedure Laterality Date    ANKLE SURGERY Left     ORIF FOOT FRACTURE Left 2017    Procedure: left FOOT fifth METATARSAL BONE screw fixation with  cadaver graft  and prp;  Surgeon: Jas Palacio MD;  Location: [facility] [place];  Service:        Family History:   Family History   Problem Relation Age of Onset    Hypertension Mother     Atrial fibrillation Father     Hypertension Other     Diabetes Other     Malig Hyperthermia Neg Hx        Social History:   Social History     Socioeconomic History  "   Marital status:    Tobacco Use    Smoking status: Never    Smokeless tobacco: Never   Substance and Sexual Activity    Alcohol use: No    Drug use: No    Sexual activity: Defer     Comment:        Medications:     Current Outpatient Medications:     buPROPion XL (Wellbutrin XL) 150 MG 24 hr tablet, Take 1 tablet by mouth Daily., Disp: 30 tablet, Rfl: 2    Allergies:   No Known Allergies    Immunization History   Administered Date(s) Administered    COVID-19 (MODERNA) 1st,2nd,3rd Dose Monovalent 04/22/2021, 05/20/2021    COVID-19 (MODERNA) Monovalent Original Booster 02/01/2022    IPV 01/01/1974, 06/13/1975, 08/08/1980, 08/26/1985    MMR 01/25/1975, 08/16/1990     Colorectal Screening:     Last Completed Colonoscopy            COLORECTAL CANCER SCREENING (COLONOSCOPY - Every 10 Years) Next due on 12/28/2032 12/28/2022  COLONOSCOPY (Done)    12/28/2022  SCANNED - COLONOSCOPY                     Diet/Physical activity: Appropriate diet and physical activity discussed.    Depression: PHQ-2 Depression Screening  Little interest or pleasure in doing things? 0-->not at all   Feeling down, depressed, or hopeless? 0-->not at all   PHQ-2 Total Score 0        Objective     Physical Exam:  Vital Signs:   Vitals:    08/08/23 0923   BP: 130/80   Pulse: (!) 44   SpO2: 98%   Weight: 89.4 kg (197 lb)   Height: 177.8 cm (70\")     Body mass index is 28.27 kg/mý.     Physical Exam  Vitals and nursing note reviewed.   Constitutional:       General: He is not in acute distress.     Appearance: Normal appearance. He is not ill-appearing.   HENT:      Head: Normocephalic and atraumatic.      Right Ear: Tympanic membrane and ear canal normal.      Left Ear: Tympanic membrane and ear canal normal.      Nose: Nose normal.   Cardiovascular:      Rate and Rhythm: Normal rate and regular rhythm.      Heart sounds: Normal heart sounds.   Pulmonary:      Effort: Pulmonary effort is normal.      Breath sounds: Normal breath " sounds.   Neurological:      Mental Status: He is alert and oriented to person, place, and time. Mental status is at baseline.   Psychiatric:         Mood and Affect: Mood normal.       Procedures    Assessment / Plan      Assessment/Plan:   Diagnoses and all orders for this visit:    1. Routine general medical examination at a health care facility (Primary)  -     CBC Auto Differential  -     Comprehensive Metabolic Panel  -     Lipid Panel  -     TSH    2. Shortness of breath  -     ECG 12 Lead  -     XR Chest 2 View; Future    3. Anxiety    Other orders  -     buPROPion XL (Wellbutrin XL) 150 MG 24 hr tablet; Take 1 tablet by mouth Daily.  Dispense: 30 tablet; Refill: 2         Check appropriate labs for his physical today.  Appropriate health maintenance discussed.  EKG was normal in the office.  Check chest x-ray.  If chest x-ray EKG and labs are all normal, we will give trial of Wellbutrin for anxiety to see if he has a difference in his symptoms.      Follow Up:   No follow-ups on file.    Healthcare Maintenance:   Counseling provided on appropriate health maintenance  Romaine Esquivel II voices understanding and acceptance of this advice and will call back with any further questions or concerns. AVS with preventive healthcare tips printed for patient.     Jimi Hu MD  [facility] [place]

## 2023-08-09 LAB
ALBUMIN SERPL-MCNC: 4.8 G/DL (ref 4.1–5.1)
ALBUMIN/GLOB SERPL: 2.4 {RATIO} (ref 1.2–2.2)
ALP SERPL-CCNC: 59 IU/L (ref 44–121)
ALT SERPL-CCNC: 16 IU/L (ref 0–44)
AST SERPL-CCNC: 19 IU/L (ref 0–40)
BASOPHILS # BLD AUTO: 0 X10E3/UL (ref 0–0.2)
BASOPHILS NFR BLD AUTO: 1 %
BILIRUB SERPL-MCNC: 0.5 MG/DL (ref 0–1.2)
BUN SERPL-MCNC: 19 MG/DL (ref 6–24)
BUN/CREAT SERPL: 15 (ref 9–20)
CALCIUM SERPL-MCNC: 9.6 MG/DL (ref 8.7–10.2)
CHLORIDE SERPL-SCNC: 102 MMOL/L (ref 96–106)
CHOLEST SERPL-MCNC: 172 MG/DL (ref 100–199)
CO2 SERPL-SCNC: 25 MMOL/L (ref 20–29)
CREAT SERPL-MCNC: 1.23 MG/DL (ref 0.76–1.27)
EGFRCR SERPLBLD CKD-EPI 2021: 72 ML/MIN/1.73
EOSINOPHIL # BLD AUTO: 0.1 X10E3/UL (ref 0–0.4)
EOSINOPHIL NFR BLD AUTO: 2 %
ERYTHROCYTE [DISTWIDTH] IN BLOOD BY AUTOMATED COUNT: 13.6 % (ref 11.6–15.4)
GLOBULIN SER CALC-MCNC: 2 G/DL (ref 1.5–4.5)
GLUCOSE SERPL-MCNC: 83 MG/DL (ref 70–99)
HCT VFR BLD AUTO: 46 % (ref 37.5–51)
HDLC SERPL-MCNC: 55 MG/DL
HGB BLD-MCNC: 15 G/DL (ref 13–17.7)
IMM GRANULOCYTES # BLD AUTO: 0 X10E3/UL (ref 0–0.1)
IMM GRANULOCYTES NFR BLD AUTO: 0 %
LDLC SERPL CALC-MCNC: 92 MG/DL (ref 0–99)
LYMPHOCYTES # BLD AUTO: 1.5 X10E3/UL (ref 0.7–3.1)
LYMPHOCYTES NFR BLD AUTO: 30 %
MCH RBC QN AUTO: 25.5 PG (ref 26.6–33)
MCHC RBC AUTO-ENTMCNC: 32.6 G/DL (ref 31.5–35.7)
MCV RBC AUTO: 78 FL (ref 79–97)
MONOCYTES # BLD AUTO: 0.5 X10E3/UL (ref 0.1–0.9)
MONOCYTES NFR BLD AUTO: 11 %
NEUTROPHILS # BLD AUTO: 2.9 X10E3/UL (ref 1.4–7)
NEUTROPHILS NFR BLD AUTO: 56 %
PLATELET # BLD AUTO: 316 X10E3/UL (ref 150–450)
POTASSIUM SERPL-SCNC: 5.1 MMOL/L (ref 3.5–5.2)
PROT SERPL-MCNC: 6.8 G/DL (ref 6–8.5)
RBC # BLD AUTO: 5.88 X10E6/UL (ref 4.14–5.8)
SODIUM SERPL-SCNC: 141 MMOL/L (ref 134–144)
TRIGL SERPL-MCNC: 146 MG/DL (ref 0–149)
TSH SERPL DL<=0.005 MIU/L-ACNC: 1.95 UIU/ML (ref 0.45–4.5)
VLDLC SERPL CALC-MCNC: 25 MG/DL (ref 5–40)
WBC # BLD AUTO: 5.1 X10E3/UL (ref 3.4–10.8)

## 2023-12-04 ENCOUNTER — OFFICE VISIT (OUTPATIENT)
Dept: FAMILY MEDICINE CLINIC | Facility: CLINIC | Age: 48
End: 2023-12-04
Payer: COMMERCIAL

## 2023-12-04 VITALS
OXYGEN SATURATION: 97 % | SYSTOLIC BLOOD PRESSURE: 132 MMHG | DIASTOLIC BLOOD PRESSURE: 92 MMHG | TEMPERATURE: 97.8 F | WEIGHT: 193.1 LBS | HEART RATE: 80 BPM | BODY MASS INDEX: 27.71 KG/M2

## 2023-12-04 DIAGNOSIS — J06.9 ACUTE URI: Primary | ICD-10-CM

## 2023-12-04 PROCEDURE — 99213 OFFICE O/P EST LOW 20 MIN: CPT | Performed by: INTERNAL MEDICINE

## 2023-12-04 RX ORDER — DEXTROMETHORPHAN HYDROBROMIDE AND PROMETHAZINE HYDROCHLORIDE 15; 6.25 MG/5ML; MG/5ML
5 SYRUP ORAL EVERY 12 HOURS PRN
Qty: 118 ML | Refills: 0 | Status: SHIPPED | OUTPATIENT
Start: 2023-12-04

## 2023-12-04 RX ORDER — BENZONATATE 100 MG/1
100 CAPSULE ORAL 3 TIMES DAILY PRN
Qty: 21 CAPSULE | Refills: 0 | Status: SHIPPED | OUTPATIENT
Start: 2023-12-04

## 2023-12-04 NOTE — PROGRESS NOTES
Office Note     Name: Romaine Esquivel II    : 1975     MRN: [id number]     Chief Complaint  Illness (Cough, drainage, past fever, > week)    Subjective     History of Present Illness:  Romaine Esquivel II is a 48 y.o. male who presents today for:      7-8 days of runny nos cough and congestion intitially had some fever and body aches too which have improved but still having cough and draiange from the trhoat.    Saw [facility] on day 1-2 and had negative covid test, they gave a cold med and a Zpak, cold med did now help but he has not started zpak yet.    Review of Systems:   Review of Systems    Past Medical History:   Past Medical History:   Diagnosis Date    Arthritis     LT ELBOW    Toe pain        Past Surgical History:   Past Surgical History:   Procedure Laterality Date    ANKLE SURGERY Left     ORIF FOOT FRACTURE Left 2017    Procedure: left FOOT fifth METATARSAL BONE screw fixation with  cadaver graft  and prp;  Surgeon: Jas Palacio MD;  Location: [facility] OR [facility];  Service:        Family History:   Family History   Problem Relation Age of Onset    Hypertension Mother     Atrial fibrillation Father     Hypertension Other     Diabetes Other     Malig Hyperthermia Neg Hx        Social History:   Social History     Socioeconomic History    Marital status:    Tobacco Use    Smoking status: Never    Smokeless tobacco: Never   Substance and Sexual Activity    Alcohol use: No    Drug use: No    Sexual activity: Not Currently     Partners: Female     Comment:        Immunizations:   Immunization History   Administered Date(s) Administered    COVID-19 (MODERNA) 1st,2nd,3rd Dose Monovalent 2021, 2021    COVID-19 (MODERNA) Monovalent Original Booster 2022    IPV 1974, 1975, 1980, 1985    MMR 1975, 1990        Medications:     Current Outpatient Medications:     buPROPion XL (Wellbutrin XL) 150 MG 24 hr tablet, Take 1 tablet  "by mouth Daily., Disp: 30 tablet, Rfl: 2      Allergies:   No Known Allergies    Objective     Vital Signs  /92 (BP Location: Right arm, Patient Position: Sitting)   Pulse 80   Temp 97.8 °F (36.6 °C) (Temporal)   Wt 87.6 kg (193 lb 1.6 oz)   SpO2 97%   BMI 27.71 kg/m²   Estimated body mass index is 27.71 kg/m² as calculated from the following:    Height as of 8/8/23: 177.8 cm (70\").    Weight as of this encounter: 87.6 kg (193 lb 1.6 oz).          Physical Exam  Vitals and nursing note reviewed.   Constitutional:       General: He is not in acute distress.     Appearance: Normal appearance.   HENT:      Right Ear: Tympanic membrane normal.      Left Ear: Tympanic membrane normal.      Nose: Rhinorrhea present.      Mouth/Throat:      Pharynx: No oropharyngeal exudate or posterior oropharyngeal erythema.   Eyes:      Conjunctiva/sclera: Conjunctivae normal.   Cardiovascular:      Rate and Rhythm: Normal rate and regular rhythm.      Heart sounds: Normal heart sounds.   Pulmonary:      Effort: Pulmonary effort is normal.      Breath sounds: Normal breath sounds. No wheezing, rhonchi or rales.   Neurological:      Mental Status: He is alert.            Procedures     Assessment and Plan     1. Acute URI    - benzonatate (Tessalon Perles) 100 MG capsule; Take 1 capsule by mouth 3 (Three) Times a Day As Needed for Cough.  Dispense: 21 capsule; Refill: 0  - promethazine-dextromethorphan (PROMETHAZINE-DM) 6.25-15 MG/5ML syrup; Take 5 mL by mouth Every 12 (Twelve) Hours As Needed for Cough.  Dispense: 118 mL; Refill: 0     Was prescribed Zpak by [facility], but has not started it, advised him to start within 2-3 days if no improvement    Follow Up  Return if symptoms worsen or fail to improve.    Patient was advised to call the office or seek medical care if  any issues discussed during this visit worsen or persist or if new concerns arise        MD KEL ZhaoE [facility] " PRIMARY CARE  [address] [place] [id number]  [phone]  Answers submitted by the patient for this visit:  Primary Reason for Visit (Submitted on 12/3/2023)  What is the primary reason for your visit?: Cough  Cough Questionnaire (Submitted on 12/3/2023)  Chief Complaint: Cough  Chronicity: new  Onset: 1 to 4 weeks ago  Progression since onset: waxing and waning  Frequency: every few hours  Cough characteristics: productive of brown sputum  ear congestion: No  headaches: No  heartburn: No  hemoptysis: No  nasal congestion: Yes  sweats: No  Aggravated by: lying down

## 2024-08-26 ENCOUNTER — TELEPHONE (OUTPATIENT)
Dept: FAMILY MEDICINE CLINIC | Facility: CLINIC | Age: 49
End: 2024-08-26
Payer: COMMERCIAL

## 2024-08-26 NOTE — TELEPHONE ENCOUNTER
"    Caller: Romaine Esquivel II \"CHIP\"    Relationship: Self    Best call back number: [phone]    Who is your current provider: GRAHAM SHERWOOD     Is your current provider offboarding? NO    Who would you like your new provider to be: DOCTOR NOLEN     What are your reasons for transferring care: PATIENT STATES THAT HE NORMALLY SEES DOCTOR NOLEN HE IS NOT SURE WHY GRAHAM SHERWOOD IS LISTED AS HIS PRIMARY  THE PATIENT WOULD LIKE TO GET AN APPOINTMENT AS SOON AS POSSIBLE WITH DOCTOR NOLEN              "

## 2024-08-27 ENCOUNTER — TELEPHONE (OUTPATIENT)
Dept: FAMILY MEDICINE CLINIC | Facility: CLINIC | Age: 49
End: 2024-08-27
Payer: COMMERCIAL

## 2024-08-27 NOTE — TELEPHONE ENCOUNTER
"    Caller: Romaine Esquivel II \"CHIP\"    Relationship to patient: Self    Best call back number: [phone]     Chief complaint: PHYSICAL     Type of visit: PHYSICAL     Requested date: BEFORE END OF SEPTEMBER       Additional notes:PATIENT HAS BEEN SCHEDULE WITH GRAHAM SHERWOOD   ON 9/9 DUE TO PRIMARY CARE PROVIDERS AVAILABILITY.        "

## 2024-08-27 NOTE — TELEPHONE ENCOUNTER
[facility] PT- GOT EVERYTHING CLEARED UP. HE IS STILL AN ESTABLISHED PT OF [place]. GOT HIS PHYSICAL SCHEDULED

## 2024-09-23 ENCOUNTER — OFFICE VISIT (OUTPATIENT)
Dept: FAMILY MEDICINE CLINIC | Facility: CLINIC | Age: 49
End: 2024-09-23
Payer: COMMERCIAL

## 2024-09-23 VITALS
DIASTOLIC BLOOD PRESSURE: 80 MMHG | HEART RATE: 44 BPM | SYSTOLIC BLOOD PRESSURE: 134 MMHG | HEIGHT: 70 IN | BODY MASS INDEX: 27.77 KG/M2 | WEIGHT: 194 LBS | OXYGEN SATURATION: 98 %

## 2024-09-23 DIAGNOSIS — R53.83 OTHER FATIGUE: ICD-10-CM

## 2024-09-23 DIAGNOSIS — Z00.00 ROUTINE GENERAL MEDICAL EXAMINATION AT A HEALTH CARE FACILITY: Primary | ICD-10-CM

## 2024-09-23 DIAGNOSIS — Z12.5 PROSTATE CANCER SCREENING: ICD-10-CM

## 2024-09-23 PROCEDURE — 99396 PREV VISIT EST AGE 40-64: CPT | Performed by: FAMILY MEDICINE

## 2024-09-23 PROCEDURE — 90471 IMMUNIZATION ADMIN: CPT | Performed by: FAMILY MEDICINE

## 2024-09-23 PROCEDURE — 90750 HZV VACC RECOMBINANT IM: CPT | Performed by: FAMILY MEDICINE

## 2024-09-23 PROCEDURE — 99213 OFFICE O/P EST LOW 20 MIN: CPT | Performed by: FAMILY MEDICINE

## 2024-09-23 RX ORDER — TADALAFIL 5 MG/1
5 TABLET ORAL DAILY PRN
Qty: 30 TABLET | Refills: 5 | Status: SHIPPED | OUTPATIENT
Start: 2024-09-23 | End: 2024-09-23

## 2024-09-23 RX ORDER — BUPROPION HYDROCHLORIDE 150 MG/1
150 TABLET ORAL DAILY
Qty: 30 TABLET | Refills: 2 | Status: SHIPPED | OUTPATIENT
Start: 2024-09-23

## 2024-09-23 RX ORDER — TADALAFIL 5 MG/1
5 TABLET ORAL DAILY PRN
Qty: 30 TABLET | Refills: 5 | Status: SHIPPED | OUTPATIENT
Start: 2024-09-23

## 2024-09-25 LAB
ALBUMIN SERPL-MCNC: 4.8 G/DL (ref 4.1–5.1)
ALP SERPL-CCNC: 63 IU/L (ref 44–121)
ALT SERPL-CCNC: 19 IU/L (ref 0–44)
AST SERPL-CCNC: 21 IU/L (ref 0–40)
BASOPHILS # BLD AUTO: 0.1 X10E3/UL (ref 0–0.2)
BASOPHILS NFR BLD AUTO: 1 %
BILIRUB SERPL-MCNC: 0.7 MG/DL (ref 0–1.2)
BUN SERPL-MCNC: 16 MG/DL (ref 6–24)
BUN/CREAT SERPL: 14 (ref 9–20)
CALCIUM SERPL-MCNC: 9.8 MG/DL (ref 8.7–10.2)
CHLORIDE SERPL-SCNC: 102 MMOL/L (ref 96–106)
CHOLEST SERPL-MCNC: 180 MG/DL (ref 100–199)
CO2 SERPL-SCNC: 23 MMOL/L (ref 20–29)
CREAT SERPL-MCNC: 1.15 MG/DL (ref 0.76–1.27)
EGFRCR SERPLBLD CKD-EPI 2021: 78 ML/MIN/1.73
EOSINOPHIL # BLD AUTO: 0.1 X10E3/UL (ref 0–0.4)
EOSINOPHIL NFR BLD AUTO: 2 %
ERYTHROCYTE [DISTWIDTH] IN BLOOD BY AUTOMATED COUNT: 13.9 % (ref 11.6–15.4)
GLOBULIN SER CALC-MCNC: 2.3 G/DL (ref 1.5–4.5)
GLUCOSE SERPL-MCNC: 81 MG/DL (ref 70–99)
HCT VFR BLD AUTO: 47.7 % (ref 37.5–51)
HDLC SERPL-MCNC: 53 MG/DL
HGB BLD-MCNC: 15.3 G/DL (ref 13–17.7)
IMM GRANULOCYTES # BLD AUTO: 0 X10E3/UL (ref 0–0.1)
IMM GRANULOCYTES NFR BLD AUTO: 0 %
LDLC SERPL CALC-MCNC: 101 MG/DL (ref 0–99)
LYMPHOCYTES # BLD AUTO: 1.7 X10E3/UL (ref 0.7–3.1)
LYMPHOCYTES NFR BLD AUTO: 33 %
MCH RBC QN AUTO: 25.7 PG (ref 26.6–33)
MCHC RBC AUTO-ENTMCNC: 32.1 G/DL (ref 31.5–35.7)
MCV RBC AUTO: 80 FL (ref 79–97)
MONOCYTES # BLD AUTO: 0.5 X10E3/UL (ref 0.1–0.9)
MONOCYTES NFR BLD AUTO: 10 %
NEUTROPHILS # BLD AUTO: 2.9 X10E3/UL (ref 1.4–7)
NEUTROPHILS NFR BLD AUTO: 54 %
PLATELET # BLD AUTO: 344 X10E3/UL (ref 150–450)
POTASSIUM SERPL-SCNC: 4.5 MMOL/L (ref 3.5–5.2)
PROT SERPL-MCNC: 7.1 G/DL (ref 6–8.5)
PSA SERPL-MCNC: 0.9 NG/ML (ref 0–4)
RBC # BLD AUTO: 5.95 X10E6/UL (ref 4.14–5.8)
SODIUM SERPL-SCNC: 141 MMOL/L (ref 134–144)
TESTOST SERPL-MCNC: 263 NG/DL (ref 264–916)
TRIGL SERPL-MCNC: 146 MG/DL (ref 0–149)
TSH SERPL DL<=0.005 MIU/L-ACNC: 2.32 UIU/ML (ref 0.45–4.5)
VLDLC SERPL CALC-MCNC: 26 MG/DL (ref 5–40)
WBC # BLD AUTO: 5.3 X10E3/UL (ref 3.4–10.8)

## 2025-08-22 ENCOUNTER — OFFICE VISIT (OUTPATIENT)
Dept: FAMILY MEDICINE CLINIC | Facility: CLINIC | Age: 50
End: 2025-08-22
Payer: COMMERCIAL

## 2025-08-22 VITALS
SYSTOLIC BLOOD PRESSURE: 124 MMHG | OXYGEN SATURATION: 99 % | DIASTOLIC BLOOD PRESSURE: 84 MMHG | HEART RATE: 53 BPM | BODY MASS INDEX: 28.66 KG/M2 | HEIGHT: 70 IN | WEIGHT: 200.2 LBS

## 2025-08-22 DIAGNOSIS — Z00.00 ROUTINE MEDICAL EXAM: Primary | ICD-10-CM

## 2025-08-22 DIAGNOSIS — Z13.220 SCREENING FOR LIPID DISORDERS: ICD-10-CM

## 2025-08-22 DIAGNOSIS — Z12.5 SCREENING FOR PROSTATE CANCER: ICD-10-CM

## 2025-08-22 DIAGNOSIS — E56.9 VITAMIN DEFICIENCY: ICD-10-CM

## 2025-08-22 DIAGNOSIS — Z13.1 SCREENING FOR DIABETES MELLITUS: ICD-10-CM

## 2025-08-22 PROCEDURE — 99396 PREV VISIT EST AGE 40-64: CPT | Performed by: NURSE PRACTITIONER

## 2025-08-22 RX ORDER — CETIRIZINE HYDROCHLORIDE 10 MG/1
10 TABLET ORAL DAILY
COMMUNITY

## (undated) DEVICE — OCCLUSIVE GAUZE STRIP,3% BISMUTH TRIBROMOPHENATE IN PETROLATUM BLEND: Brand: XEROFORM

## (undated) DEVICE — SUT VIC 3/0 SH 27IN J416H

## (undated) DEVICE — GLV SURG BIOGEL LTX PF 8

## (undated) DEVICE — DISPOSABLE TOURNIQUET CUFF SINGLE BLADDER, SINGLE PORT AND QUICK CONNECT CONNECTOR: Brand: COLOR CUFF

## (undated) DEVICE — SYR LUERLOK 5CC

## (undated) DEVICE — SKIN PREP TRAY W/CHG: Brand: MEDLINE INDUSTRIES, INC.

## (undated) DEVICE — SYS PERFUS SEP PLATLT W TIPS CUST

## (undated) DEVICE — WEBRIL* CAST PADDING: Brand: DEROYAL

## (undated) DEVICE — GOWN,NON-REINFORCED,SIRUS,SET IN SLV,XXL: Brand: MEDLINE

## (undated) DEVICE — BNDG GZ SOF-FORM CONFRM 2X75IN LF STRL

## (undated) DEVICE — Device

## (undated) DEVICE — UNDERCAST PADDING: Brand: DEROYAL

## (undated) DEVICE — BNDG ELAS CO-FLEX SLF ADHR 2IN 5YD LF STRL

## (undated) DEVICE — PK ORTHO MINOR TOWER 40

## (undated) DEVICE — GLV SURG TRIUMPH CLASSIC PF LTX 8 STRL

## (undated) DEVICE — STPLR SKIN VISISTAT WD 35CT

## (undated) DEVICE — SHOE P/OP/CAST O/T M MD 9/11

## (undated) DEVICE — APPL CHLORAPREP W/TINT 26ML ORNG

## (undated) DEVICE — ELECTRD NDL EZ CLN MOD 2.75IN

## (undated) DEVICE — DRP C/ARMOR